# Patient Record
Sex: FEMALE | Race: WHITE | URBAN - METROPOLITAN AREA
[De-identification: names, ages, dates, MRNs, and addresses within clinical notes are randomized per-mention and may not be internally consistent; named-entity substitution may affect disease eponyms.]

---

## 2020-01-08 ENCOUNTER — EMERGENCY (EMERGENCY)
Facility: HOSPITAL | Age: 60
LOS: 1 days | Discharge: HOME | End: 2020-01-08
Attending: EMERGENCY MEDICINE | Admitting: EMERGENCY MEDICINE
Payer: COMMERCIAL

## 2020-01-08 VITALS
SYSTOLIC BLOOD PRESSURE: 134 MMHG | HEIGHT: 62 IN | HEART RATE: 76 BPM | RESPIRATION RATE: 16 BRPM | DIASTOLIC BLOOD PRESSURE: 94 MMHG | WEIGHT: 119.93 LBS | TEMPERATURE: 97 F | OXYGEN SATURATION: 98 %

## 2020-01-08 PROCEDURE — 99283 EMERGENCY DEPT VISIT LOW MDM: CPT

## 2020-01-08 RX ORDER — VALACYCLOVIR 500 MG/1
1 TABLET, FILM COATED ORAL
Qty: 21 | Refills: 0
Start: 2020-01-08 | End: 2020-01-14

## 2020-01-08 RX ORDER — ERYTHROMYCIN BASE 5 MG/GRAM
1 OINTMENT (GRAM) OPHTHALMIC (EYE)
Qty: 1 | Refills: 0
Start: 2020-01-08 | End: 2020-01-17

## 2020-01-08 RX ORDER — VALACYCLOVIR 500 MG/1
1000 TABLET, FILM COATED ORAL ONCE
Refills: 0 | Status: COMPLETED | OUTPATIENT
Start: 2020-01-08 | End: 2020-01-08

## 2020-01-08 RX ADMIN — VALACYCLOVIR 1000 MILLIGRAM(S): 500 TABLET, FILM COATED ORAL at 10:50

## 2020-01-08 RX ADMIN — Medication 50 MILLIGRAM(S): at 10:50

## 2020-01-08 NOTE — ED PROVIDER NOTE - PHYSICAL EXAMINATION
VITAL SIGNS: I have reviewed nursing notes and confirm.  CONSTITUTIONAL: Well-developed; well-nourished; in no acute distress.  SKIN: Skin exam is warm and dry, no acute rash.  HEAD: Normocephalic; atraumatic.  EYES: PERRL, EOM intact; conjunctiva and sclera clear.  ENT: No nasal discharge; airway clear.  NECK: Supple; non tender.  CARD: S1, S2 normal; no murmurs, gallops, or rubs. Regular rate and rhythm.  RESP: Unlabored. No wheezes, rales or rhonchi.  ABD: soft; non-distended; non-tender  EXT: Normal ROM. No cyanosis or edema. Non-ttp all ext, distal pulses intact  LYMPH: No acute cervical adenopathy.  NEURO: Left sided facial weakness involving the forehead. Patient is alert, oriented x person, place and time. Normal gait and speech.  Cerebellar testing normal:  negative Romberg, normal coordination and normal finger to nose, heal to shin and rapid alternating movements.  No pronator drift.  5/5 bl upper extremity and lower extremity strength. Visual fields intact   PSYCH: Cooperative, appropriate.

## 2020-01-08 NOTE — ED ADULT NURSE NOTE - OBJECTIVE STATEMENT
PT states that last night she noticed a droop of the right side of the face. No other neurological defecits.

## 2020-01-08 NOTE — ED PROVIDER NOTE - CLINICAL SUMMARY MEDICAL DECISION MAKING FREE TEXT BOX
60 y/o F presenting with L sided facial numbness. On exam, Pt has eft sided facial weakness with involvement of the forehead. Pt otherwise appears well and in no apparent distress. Symptoms are most consistent with Bell's Palsy. Will give a dose of Valtrex and Prednisone here in the ED. Anticipate D/C afterwards with prescription medications.

## 2020-01-08 NOTE — ED PROVIDER NOTE - PATIENT PORTAL LINK FT
You can access the FollowMyHealth Patient Portal offered by [facility] by registering at the following website: [email or web address]. By joining [facility]’s FollowMyHealth portal, you will also be able to view your health information using other applications (apps) compatible with our system.

## 2020-01-08 NOTE — ED PROVIDER NOTE - OBJECTIVE STATEMENT
60 y/o F with no PMHx presents to the ED for L sided facial numbness. Last night, Pt reports having a burning sensation in the L eye with a mild HA. Today, Pt went for her routine workout at the gym. After her work out, she went to get a drink of water when she had some "difficulty with drinking water". Pt then went to apply some makeup on her face when she noticed the left side of her face seemed numb and stiff. She went to Urgent Care and was referred to the ED for further evaluation. Pt denies ear pain and Hx of herpes.

## 2020-01-13 DIAGNOSIS — G51.0 BELL'S PALSY: ICD-10-CM

## 2020-01-13 DIAGNOSIS — Z88.0 ALLERGY STATUS TO PENICILLIN: ICD-10-CM

## 2020-01-13 DIAGNOSIS — R29.810 FACIAL WEAKNESS: ICD-10-CM

## 2022-11-28 ENCOUNTER — APPOINTMENT (RX ONLY)
Dept: URBAN - METROPOLITAN AREA CLINIC 94 | Facility: CLINIC | Age: 62
Setting detail: DERMATOLOGY
End: 2022-11-28

## 2022-11-28 DIAGNOSIS — D492 NEOPLASM OF UNSPECIFIED NATURE OF BONE, SOFT TISSUE, AND SKIN: ICD-10-CM

## 2022-11-28 PROBLEM — R22.0 LOCALIZED SWELLING, MASS AND LUMP, HEAD: Status: ACTIVE | Noted: 2022-11-28

## 2022-11-28 PROCEDURE — 99202 OFFICE O/P NEW SF 15 MIN: CPT

## 2022-11-28 PROCEDURE — ? COUNSELING

## 2022-11-28 ASSESSMENT — LOCATION ZONE DERM: LOCATION ZONE: SCALP

## 2022-11-28 ASSESSMENT — LOCATION DETAILED DESCRIPTION DERM: LOCATION DETAILED: RIGHT SUPERIOR OCCIPITAL SCALP

## 2022-11-28 ASSESSMENT — LOCATION SIMPLE DESCRIPTION DERM: LOCATION SIMPLE: POSTERIOR SCALP

## 2022-11-28 NOTE — PROCEDURE: COUNSELING
Patient Specific Counseling (Will Not Stick From Patient To Patient): Recommend seeing head and neck surgeon for further evaluation. Discussed with patient that it does not feel like a typical skin cyst, such as a pilar cyst or epidermal cyst. Discussed that it is not a nodule that I feel comfortable performing a biopsy on, as it is very firm and possibly part of the skull or deeply fixed to the skull. \\nReviewed the ultrasound report which called it indeterminate and recommended a follow up ultrasound. Patient would prefer a tissue diagnosis. Refer to head and neck surgeon for further work up.
Detail Level: Detailed

## 2022-12-14 ENCOUNTER — APPOINTMENT (RX ONLY)
Dept: URBAN - METROPOLITAN AREA CLINIC 101 | Facility: CLINIC | Age: 62
Setting detail: DERMATOLOGY
End: 2022-12-14

## 2022-12-14 DIAGNOSIS — Z41.9 ENCOUNTER FOR PROCEDURE FOR PURPOSES OTHER THAN REMEDYING HEALTH STATE, UNSPECIFIED: ICD-10-CM

## 2022-12-14 PROCEDURE — ? DYSPORT

## 2022-12-14 ASSESSMENT — LOCATION ZONE DERM
LOCATION ZONE: FACE
LOCATION ZONE: EYELID

## 2022-12-14 ASSESSMENT — LOCATION SIMPLE DESCRIPTION DERM
LOCATION SIMPLE: RIGHT TEMPLE
LOCATION SIMPLE: GLABELLA
LOCATION SIMPLE: RIGHT EYELID
LOCATION SIMPLE: LEFT CHEEK
LOCATION SIMPLE: RIGHT FOREHEAD
LOCATION SIMPLE: LEFT TEMPLE
LOCATION SIMPLE: LEFT FOREHEAD
LOCATION SIMPLE: LEFT EYEBROW
LOCATION SIMPLE: RIGHT EYEBROW
LOCATION SIMPLE: SUPERIOR FOREHEAD

## 2022-12-14 ASSESSMENT — LOCATION DETAILED DESCRIPTION DERM
LOCATION DETAILED: LEFT SUPERIOR LATERAL MALAR CHEEK
LOCATION DETAILED: SUPERIOR MID FOREHEAD
LOCATION DETAILED: RIGHT MID TEMPLE
LOCATION DETAILED: GLABELLA
LOCATION DETAILED: RIGHT CENTRAL EYEBROW
LOCATION DETAILED: LEFT MID TEMPLE
LOCATION DETAILED: LEFT LATERAL FOREHEAD
LOCATION DETAILED: RIGHT LATERAL FOREHEAD
LOCATION DETAILED: RIGHT LATERAL CANTHUS
LOCATION DETAILED: LEFT MEDIAL EYEBROW
LOCATION DETAILED: LEFT SUPERIOR FOREHEAD
LOCATION DETAILED: RIGHT SUPERIOR FOREHEAD

## 2022-12-14 NOTE — PROCEDURE: DYSPORT
Additional Area 1 Units: 0
Show Inventory Tab: Show
Postcare Instructions: Patient instructed to not lie down for 4 hours and limit physical activity for 24 hours. Patient instructed not to travel by airplane for 48 hours.
Map Statment: See attached map for complete details
Periorbital Skin Units: [address]
Dilution (U/ 0.1cc): 10
Show Price In Note?: no
Consent: Written consent was obtained prior to the procedure. Risks, benefits, expectations and alternatives were discussed including, but not limited to, infection, bleeding, lid/brow ptosis, bruising, swelling, diplopia, temporary effects, incomplete chemical denervation and dissatisfaction with the cosmetic outcome. No guarantee or warranty was given or implied regarding longevity of results.
Bill Summary Price Listed Below, Or Bill Total Of Units X Price Per Unit?: Bill Summary Price Below
Glabellar Complex Units: [address]
Forehead Units: [address]
Detail Level: Detailed

## 2023-06-09 NOTE — ED PROVIDER NOTE - DISCHARGE DATE
I attest my time as attending is greater than 50% of the total combined time spent on qualifying patient care activities by the PA/NP and attending.
08-Jan-2020

## 2024-10-31 NOTE — ED PROVIDER NOTE - DISCHARGE REVIEW MATERIAL PRESENTED
[FreeTextEntry1] : Hospital records reviewed Will repeat blood work today Continue at home medications Follow up in 3 months  .

## 2025-05-28 NOTE — ED PROVIDER NOTE - DR. NAME
[FreeTextEntry1] : #Left plantar fasciitis  Plantar fibroma? Discussed diagnosis and treatment with patient  Discussed etiology of symptoms patient is experiencing  Demonstrated proper stretching techniques with patient showing understanding  Discussed proper RICE techniques to reduce inflammation and for pain control  Discussed specific examples of over-the-counter inserts to control heel eversion and support the medial arch Discussed proper shoes (stiff heel counter and midsole with flexion at the 1st MTPJ)   Discussed benefits physical therapy   The Sharp Shape 3D laser foot scanner was placed in an upright position and the height of the patients chair was adjusted so that the patients feet would be in proper alignment perpendicular to the scanner. The right foot was placed upon the scanner and held in neutral position with moderate pressure of the foot against the scanner with the plantar aspect of the foot against the scanner. Orientation of the foot to the scanner was lined up with the central line of the scanner. The foot pedal was depressed to initiate the first scan. The integrity of the image scanned was checked and the final scanner file was written to the hard drive. The same procedure was performed on the left foot. The scanned images were then forwarded to the orthotic lab via E-mail to fabricate supportive accommodative UCB-type orthotics to go into a variety of shoes. CUTOUT WAS PLACED FOR FIBROMA OFFLOADING Prescribed topical diclofenac  An MRI of the ANkle/midfoot was ordered to evaluate for left foot fibroma and evaluate the plantar fascia. she has failed traditional conservative treatment and would benefit from furtheri kacey Discussed all risks, complications, and benefits of corticosteroid injection therapy.    Skin prepped with alcohol and corticosteroid injection Left Plantar medial fascial band with 1 cc of 0.5% Marcaine plain and 4 mg Dexamethasone Phosphat 1cc celestone.   Patient tolerated procedure well and a dry bandage applied to Left foot. Patient to return to the office in 3-4 weeks  Grant